# Patient Record
Sex: FEMALE | Race: WHITE | Employment: OTHER | URBAN - METROPOLITAN AREA
[De-identification: names, ages, dates, MRNs, and addresses within clinical notes are randomized per-mention and may not be internally consistent; named-entity substitution may affect disease eponyms.]

---

## 2021-08-10 ENCOUNTER — TRANSCRIBE ORDER (OUTPATIENT)
Dept: SCHEDULING | Age: 71
End: 2021-08-10

## 2021-08-10 DIAGNOSIS — Z12.31 ENCOUNTER FOR SCREENING MAMMOGRAM FOR MALIGNANT NEOPLASM OF BREAST: ICD-10-CM

## 2021-08-10 DIAGNOSIS — Z78.0 ASYMPTOMATIC MENOPAUSAL STATE: Primary | ICD-10-CM

## 2021-10-04 ENCOUNTER — HOSPITAL ENCOUNTER (OUTPATIENT)
Dept: GENERAL RADIOLOGY | Age: 71
Discharge: HOME | End: 2021-10-04
Attending: NURSE PRACTITIONER
Payer: MEDICARE

## 2021-10-04 ENCOUNTER — HOSPITAL ENCOUNTER (OUTPATIENT)
Dept: MAMMOGRAPHY | Age: 71
Discharge: HOME | End: 2021-10-04
Attending: NURSE PRACTITIONER
Payer: MEDICARE

## 2021-10-04 VITALS — BODY MASS INDEX: 26.43 KG/M2 | HEIGHT: 61 IN | WEIGHT: 140 LBS

## 2021-10-04 DIAGNOSIS — Z78.0 ASYMPTOMATIC MENOPAUSAL STATE: ICD-10-CM

## 2021-10-04 DIAGNOSIS — Z12.31 ENCOUNTER FOR SCREENING MAMMOGRAM FOR MALIGNANT NEOPLASM OF BREAST: ICD-10-CM

## 2021-10-04 PROCEDURE — 77063 BREAST TOMOSYNTHESIS BI: CPT

## 2021-10-04 PROCEDURE — 77080 DXA BONE DENSITY AXIAL: CPT

## 2021-10-27 ENCOUNTER — HOSPITAL ENCOUNTER (OUTPATIENT)
Dept: MRI IMAGING | Age: 71
Discharge: HOME | End: 2021-10-27
Attending: NURSE PRACTITIONER
Payer: MEDICARE

## 2021-10-27 DIAGNOSIS — S80.02XD CONTUSION OF LEFT KNEE, SUBSEQUENT ENCOUNTER: ICD-10-CM

## 2021-10-27 PROCEDURE — 73721 MRI JNT OF LWR EXTRE W/O DYE: CPT

## 2022-11-14 ENCOUNTER — HOSPITAL ENCOUNTER (OUTPATIENT)
Dept: INFUSION THERAPY | Age: 72
Discharge: HOME | End: 2022-11-14
Payer: MEDICARE

## 2022-11-14 VITALS
TEMPERATURE: 97.8 F | BODY MASS INDEX: 27.32 KG/M2 | WEIGHT: 144.6 LBS | DIASTOLIC BLOOD PRESSURE: 71 MMHG | OXYGEN SATURATION: 96 % | HEART RATE: 78 BPM | RESPIRATION RATE: 20 BRPM | SYSTOLIC BLOOD PRESSURE: 122 MMHG

## 2022-11-14 PROCEDURE — 74011250636 HC RX REV CODE- 250/636: Performed by: NURSE PRACTITIONER

## 2022-11-14 PROCEDURE — 96372 THER/PROPH/DIAG INJ SC/IM: CPT

## 2022-11-14 RX ORDER — ROSUVASTATIN CALCIUM 5 MG/1
5 TABLET, COATED ORAL
COMMUNITY

## 2022-11-14 RX ORDER — IBUPROFEN 200 MG
CAPSULE ORAL DAILY
COMMUNITY

## 2022-11-14 RX ORDER — ASPIRIN 325 MG
325 TABLET ORAL
COMMUNITY

## 2022-11-14 RX ORDER — CHOLECALCIFEROL (VITAMIN D3) 50 MCG
CAPSULE ORAL
COMMUNITY

## 2022-11-14 RX ADMIN — DENOSUMAB 60 MG: 60 INJECTION SUBCUTANEOUS at 11:38

## 2022-11-14 NOTE — DISCHARGE INSTRUCTIONS
denosumab (Prolia)  Pronunciation:  crispin noel mab  Brand:  Prolia  What is the most important information I should know about Prolia? This medication guide provides information about the Prolia brand of denosumab. Ronette Jakub is another brand of denosumab used to prevent bone fractures and other skeletal conditions in people with tumors that have spread to the bone. Prolia can cause many serious side effects. Call your doctor at once if you have a fever, chills, pain or burning when you urinate, severe stomach pain, cough, shortness of breath, skin problems, numbness or tingling, severe or unusual pain, or skin problems. Do not use if you are pregnant. Use effective birth control while using Prolia, and for at least 5 months after you stop. What is denosumab (Prolia)? Denosumab is a monoclonal antibody. Monoclonal antibodies are made to target and destroy only certain cells in the body. This may help to protect healthy cells from damage. The Prolia brand of denosumab is used to treat osteoporosis or bone loss in men and women who have a high risk of bone fracture. Prolia is sometimes used in people whose bone fracture is caused by certain medicines or cancer treatments. This medication guide provides information about the Prolia brand of denosumab. Ronette Jakub is another brand of denosumab used to prevent bone fractures and other skeletal conditions in people with tumors that have spread to the bone. Denosumab may also be used for purposes not listed in this medication guide. What should I discuss with my healthcare provider before receiving Prolia? You should not receive Prolia if you are allergic to denosumab, or if you have:  low levels of calcium in your blood (hypocalcemia); or  if you are pregnant. While you are using Prolia, you should not receive Xgeva, another brand of denosumab.   Tell your doctor if you have ever had:  kidney disease (or if you are on dialysis);  a weak immune system (caused by disease or by using certain medicines);  hypoparathyroidism (decreased functioning of the parathyroid glands);  thyroid surgery;  any condition that makes it hard for your body to absorb nutrients from food (malabsorption);  a latex allergy;  if you are scheduled for a dental procedure; or  if you cannot take daily calcium and vitamin D. Denosumab may cause bone loss (osteonecrosis) in the jaw. Symptoms include jaw pain or numbness, red or swollen gums, loose teeth, gum infection, or slow healing after dental work. The risk of osteonecrosis is highest in people with cancer, blood cell disorders, pre-existing dental problems, or people treated with steroids, chemotherapy, or radiation. Ask your doctor about your own risk. You may need to have a negative pregnancy test before starting this treatment. Do not use Prolia if you are pregnant. It could harm the unborn baby or cause birth defects. Use effective birth control to prevent pregnancy while you are using this medicine and for at least 5 months after your last dose. Tell your doctor right away if you become pregnant. You should not breastfeed while using denosumab. How is Prolia given? Denosumab is injected under the skin of your stomach, upper thigh, or upper arm. A healthcare provider will give you this injection. Prolia is usually given once every 6 months. Your doctor may have you take extra calcium and vitamin D while you are being treated with denosumab. Take only the amount of calcium and vitamin D that your doctor has prescribed. If you need to have any dental work (especially surgery), tell the dentist ahead of time that you are receiving denosumab. Pay special attention to your dental hygiene. Brush and floss your teeth regularly while receiving this medication. You may need to have a dental exam before you begin treatment with Prolia. Follow your doctor's instructions. Your risk of bone fractures can increase when you stop using Prolia.  Do not stop using this medicine without first talking to your doctor. If you keep this medicine at home, store it in the original carton in a refrigerator. Protect from light and do not freeze. Do not shake the prefilled syringe. You may take the carton out of the refrigerator and allow it to reach room temperature before the injection is given. After you have taken Prolia out of the refrigerator, you may keep it at room temperature for up to 14 days. Store in the original container away from heat and light. Each prefilled syringe is for one use only. Throw it away after one use, even if there is still medicine left inside. Use a needle and syringe only once and then place them in a puncture-proof \"sharps\" container. Follow state or local laws about how to dispose of this container. Keep it out of the reach of children and pets. Do not share this medicine with another person, even if they have the same symptoms you have. What happens if I miss a dose? Call your doctor for instructions if you miss a dose or miss an appointment for your Prolia injection. You should receive your missed injection as soon as possible. What happens if I overdose? Seek emergency medical attention or call the Poison Help line at 1-495.508.6092. What should I avoid while receiving Prolia? Follow your doctor's instructions about any restrictions on food, beverages, or activity. What are the possible side effects of Prolia? Get emergency medical help if you have signs of an allergic reaction: hives, itching, rash; difficult breathing, feeling light-headed; swelling of your face, lips, tongue, or throat.   Call your doctor at once if you have:  new or unusual pain in your thigh, hip, or groin;  severe pain in your joints, muscles, or bones;  skin problems such as dryness, peeling, redness, itching, blisters, bumps, oozing, or crusting; or  low calcium level --muscle spasms or contractions, numbness or tingly feeling (around your mouth, or in your fingers and toes). Serious infections may occur during treatment with Prolia. Call your doctor right away if you have signs of infection such as:  fever, chills, night sweats;  swelling, pain, tenderness, warmth, or redness anywhere on your body;  pain or burning when you urinate;  increased or urgent need to urinate;  severe stomach pain; or  cough, wheezing, feeling short of breath. Common side effects may include:  bladder infection (painful or difficult urination);  lung infection (cough, shortness of breath);  headache;  back pain, muscle pain, joint pain;  increased blood pressure;  cold symptoms such as stuffy nose, sneezing, sore throat;  high cholesterol; or  pain in your arms or legs. This is not a complete list of side effects and others may occur. Call your doctor for medical advice about side effects. You may report side effects to FDA at 1-876-FDA-1774. What other drugs will affect Prolia? Other drugs may affect Prolia, including prescription and over-the-counter medicines, vitamins, and herbal products. Tell your doctor about all your current medicines and any medicine you start or stop using. Where can I get more information? Your doctor or pharmacist can provide more information about denosumab (Prolia). Remember, keep this and all other medicines out of the reach of children, never share your medicines with others, and use this medication only for the indication prescribed. Every effort has been made to ensure that the information provided by [address] is accurate, up-to-date, and complete, but no guarantee is made to that effect. Drug information contained herein may be time sensitive. [facility] information has been compiled for use by healthcare practitioners and consumers in the [place] and therefore [employer] does not warrant that uses outside of the [place] are appropriate, unless specifically indicated otherwise.  [facility]'s drug information does not endorse drugs, diagnose patients or recommend therapy. [facility]'s drug information is an informational resource designed to assist licensed healthcare practitioners in caring for their patients and/or to serve consumers viewing this service as a supplement to, and not a substitute for, the expertise, skill, knowledge and judgment of healthcare practitioners. The absence of a warning for a given drug or drug combination in no way should be construed to indicate that the drug or drug combination is safe, effective or appropriate for any given patient. [facility] does not assume any responsibility for any aspect of healthcare administered with the aid of information [facility] provides. The information contained herein is not intended to cover all possible uses, directions, precautions, warnings, drug interactions, allergic reactions, or adverse effects. If you have questions about the drugs you are taking, check with your doctor, nurse or pharmacist.  Copyright 4235-3348 [address]: 12.01. Revision date: 2/18/2020. Care instructions adapted under license by [employer] (which disclaims liability or warranty for this information). If you have questions about a medical condition or this instruction, always ask your healthcare professional. [place] [address] any warranty or liability for your use of this information.

## 2022-11-14 NOTE — PROGRESS NOTES
\[facility]\"" [facility] Progress Note    Date: 2022    Name: Roxanne Hassan    MRN: [id number]         : 1950      Ms. Ammy Santos was assessed and education was provided. Ambulatory and voiced no new concerns. Ms. Karina Pabon vitals were reviewed and patient was observed for 5 minutes prior to treatment. Visit Vitals  /71 (BP 1 Location: Left upper arm, BP Patient Position: Sitting)   Pulse 78   Temp 97.8 °F (36.6 °C)   Resp 20   Wt 65.6 kg (144 lb 9.6 oz)   SpO2 96%   BMI 27.32 kg/m²       Lab results were reviewed. Prolia 60 mg  was administered subcutaneous in her right arm. No redness, pain or swelling at the site. Band aid applied. Ms. Ammy Santos tolerated well, and had no complaints. Patient armband removed and shredded. Ms. Ammy Santos was discharged from [place] [address] in stable condition at 11:50. She is to return on May 15th at 11:00 for her next appointment.     Hailey Toure RN  2022  12:33 PM

## 2023-01-16 ENCOUNTER — TRANSCRIBE ORDER (OUTPATIENT)
Dept: REGISTRATION | Age: 73
End: 2023-01-16

## 2023-01-16 ENCOUNTER — HOSPITAL ENCOUNTER (OUTPATIENT)
Dept: GENERAL RADIOLOGY | Age: 73
Discharge: HOME | End: 2023-01-16
Payer: MEDICARE

## 2023-01-16 DIAGNOSIS — M25.561 PAIN IN RIGHT KNEE: ICD-10-CM

## 2023-01-16 DIAGNOSIS — M25.561 PAIN IN RIGHT KNEE: Primary | ICD-10-CM

## 2023-01-16 PROCEDURE — 73564 X-RAY EXAM KNEE 4 OR MORE: CPT

## 2023-02-07 ENCOUNTER — HOSPITAL ENCOUNTER (OUTPATIENT)
Dept: MAMMOGRAPHY | Age: 73
Discharge: HOME | End: 2023-02-07
Attending: NURSE PRACTITIONER
Payer: MEDICARE

## 2023-02-07 DIAGNOSIS — Z12.31 VISIT FOR SCREENING MAMMOGRAM: ICD-10-CM

## 2023-02-07 PROCEDURE — 77063 BREAST TOMOSYNTHESIS BI: CPT

## 2023-04-04 ENCOUNTER — TRANSCRIBE ORDER (OUTPATIENT)
Dept: REGISTRATION | Age: 73
End: 2023-04-04

## 2023-04-04 ENCOUNTER — HOSPITAL ENCOUNTER (OUTPATIENT)
Dept: GENERAL RADIOLOGY | Age: 73
End: 2023-04-04
Payer: MEDICARE

## 2023-04-04 PROCEDURE — 73630 X-RAY EXAM OF FOOT: CPT

## 2023-04-07 DIAGNOSIS — M81.0 OSTEOPOROSIS WITHOUT CURRENT PATHOLOGICAL FRACTURE, UNSPECIFIED OSTEOPOROSIS TYPE: ICD-10-CM

## 2023-04-09 DIAGNOSIS — M81.0 OSTEOPOROSIS WITHOUT CURRENT PATHOLOGICAL FRACTURE, UNSPECIFIED OSTEOPOROSIS TYPE: Primary | ICD-10-CM

## 2023-04-24 ENCOUNTER — HOSPITAL ENCOUNTER (OUTPATIENT)
Dept: GENERAL RADIOLOGY | Age: 73
Discharge: HOME | End: 2023-04-24
Payer: MEDICARE

## 2023-04-24 ENCOUNTER — TRANSCRIBE ORDER (OUTPATIENT)
Dept: REGISTRATION | Age: 73
End: 2023-04-24

## 2023-04-24 DIAGNOSIS — M79.642 LEFT HAND PAIN: ICD-10-CM

## 2023-04-24 DIAGNOSIS — M79.642 LEFT HAND PAIN: Primary | ICD-10-CM

## 2023-04-24 PROCEDURE — 73130 X-RAY EXAM OF HAND: CPT

## 2023-05-15 ENCOUNTER — HOSPITAL ENCOUNTER (OUTPATIENT)
Facility: HOSPITAL | Age: 73
Discharge: HOME | End: 2023-05-18
Payer: MEDICARE

## 2023-05-15 ENCOUNTER — HOSPITAL ENCOUNTER (OUTPATIENT)
Facility: HOSPITAL | Age: 73
Setting detail: INFUSION SERIES
End: 2023-05-15
Payer: MEDICARE

## 2023-05-15 DIAGNOSIS — M81.0 OSTEOPOROSIS WITHOUT CURRENT PATHOLOGICAL FRACTURE, UNSPECIFIED OSTEOPOROSIS TYPE: ICD-10-CM

## 2023-05-15 DIAGNOSIS — R68.84 JAW PAIN: ICD-10-CM

## 2023-05-15 LAB
ALBUMIN SERPL-MCNC: 3.5 G/DL (ref 3.5–5)
ALBUMIN/GLOB SERPL: 1 (ref 1.1–2.2)
ALP SERPL-CCNC: 47 U/L (ref 45–117)
ALT SERPL-CCNC: 27 U/L (ref 12–78)
ANION GAP SERPL CALC-SCNC: 9 MMOL/L (ref 5–15)
AST SERPL-CCNC: 23 U/L (ref 15–37)
BILIRUB SERPL-MCNC: 0.3 MG/DL (ref 0.2–1)
BUN SERPL-MCNC: 17 MG/DL (ref 6–20)
BUN/CREAT SERPL: 29 (ref 12–20)
CALCIUM SERPL-MCNC: 9.2 MG/DL (ref 8.5–10.1)
CHLORIDE SERPL-SCNC: 105 MMOL/L (ref 97–108)
CO2 SERPL-SCNC: 27 MMOL/L (ref 21–32)
CREAT SERPL-MCNC: 0.58 MG/DL (ref 0.55–1.02)
GLOBULIN SER CALC-MCNC: 3.4 G/DL (ref 2–4)
GLUCOSE SERPL-MCNC: 109 MG/DL (ref 65–100)
POTASSIUM SERPL-SCNC: 4.4 MMOL/L (ref 3.5–5.1)
PROT SERPL-MCNC: 6.9 G/DL (ref 6.4–8.2)
SODIUM SERPL-SCNC: 141 MMOL/L (ref 136–145)

## 2023-05-15 PROCEDURE — 70110 X-RAY EXAM OF JAW 4/> VIEWS: CPT

## 2023-05-15 PROCEDURE — 36415 COLL VENOUS BLD VENIPUNCTURE: CPT

## 2023-05-15 PROCEDURE — 80053 COMPREHEN METABOLIC PANEL: CPT

## 2023-05-15 NOTE — PROGRESS NOTES
Pt arrived ambulatory and in no distress for peripheral lab draw prior to Prolia injection. Pt reported intermittent jaw pain recently and in the past and stated that when the pain occurs sometimes it lasts for up to 15 minutes and is very severe. She denies any jaw pain at the time of her [facility] visit. Labs drawn and in process. Ca with tx parameters. This RN contacted pt PCP and she is ordering jaw xrays due to potential osteonecrosis of the jaw. Pt denies any recent significant dental history. This RN spoke with pt PCP and she will follow up with [facility] once the xray results are complete. Called and spoke with patient and shared information. Understanding verbalized.

## 2023-08-11 ENCOUNTER — OFFICE VISIT (OUTPATIENT)
Age: 73
End: 2023-08-11

## 2023-08-11 VITALS
WEIGHT: 145 LBS | HEIGHT: 62 IN | TEMPERATURE: 98.3 F | SYSTOLIC BLOOD PRESSURE: 120 MMHG | DIASTOLIC BLOOD PRESSURE: 80 MMHG | RESPIRATION RATE: 16 BRPM | BODY MASS INDEX: 26.68 KG/M2 | OXYGEN SATURATION: 97 % | HEART RATE: 65 BPM

## 2023-08-11 DIAGNOSIS — Z12.11 COLON CANCER SCREENING: Primary | ICD-10-CM

## 2023-08-11 ASSESSMENT — PATIENT HEALTH QUESTIONNAIRE - PHQ9
2. FEELING DOWN, DEPRESSED OR HOPELESS: 0
SUM OF ALL RESPONSES TO PHQ QUESTIONS 1-9: 0
SUM OF ALL RESPONSES TO PHQ9 QUESTIONS 1 & 2: 0
1. LITTLE INTEREST OR PLEASURE IN DOING THINGS: 0
SUM OF ALL RESPONSES TO PHQ QUESTIONS 1-9: 0

## 2023-08-11 NOTE — PROGRESS NOTES
Identified pt with two pt identifiers (name and ). Reviewed chart in preparation for visit and have obtained necessary documentation. Tal Palomares is a 74 y.o. female New Patient and Colonoscopy  . There were no vitals filed for this visit. 1. Have you been to the ER, urgent care clinic since your last visit? Hospitalized since your last visit?  no     2. Have you seen or consulted any other health care providers outside of the [address] [place] [address] since your last visit? Include any pap smears or colon screening.   no

## 2023-08-11 NOTE — PROGRESS NOTES
Leslie Lopes is a 74 y.o. female who presents today with the following:  Chief Complaint   Patient presents with    New Patient    Colonoscopy       HPI    77-year-old female who presents as a referral from [facility] for possible colonoscopy. She believes her last colonoscopy was in 2019 while in [place]. She states that it was normal and no polyps were removed. She was told that she needed a repeat in 3 years. Her mother had colon cancer at 80. She is not aware of any other reason why she was told that she needed a colonoscopy in 3 years. Fortunately she brings the name and address of the provider who is Dr. Cristela Hernandez at [address] [place] [address]. [address] [place] [address], [address] [place] [address]. Phone number listed is area code [phone] and fax number is listed is [phone]. Currently she denies any personal history of melena or hematochezia or diarrhea or constipation. She has had no significant abdominal pain or unexpected weight loss. She has had no change in the caliber of her stool. She has had no recent stool testing. She has had a history of a cholecystectomy and tonsillectomy and back surgery. She does take an 81 mg aspirin 3 times a week as well as omega-3 oil. She has been vaccinated for COVID. Past Medical History:   Diagnosis Date    Menopause        No past surgical history on file.     Social History     Socioeconomic History    Marital status:      Spouse name: Not on file    Number of children: Not on file    Years of education: Not on file    Highest education level: Not on file   Occupational History    Not on file   Tobacco Use    Smoking status: Former     Types: Cigarettes    Smokeless tobacco: Not on file   Substance and Sexual Activity    Alcohol use: Not on file    Drug use: Not on file    Sexual activity: Not on file   Other Topics Concern    Not on file   Social History Narrative    Not on file     Social Determinants of Health     Financial

## 2023-08-11 NOTE — PATIENT INSTRUCTIONS
Learning About Colonoscopy  What is a colonoscopy? A colonoscopy is a test (also called a procedure) that lets a doctor look inside your large intestine. The doctor uses a thin, lighted tube called a colonoscope. The doctor uses it to look for small growths called polyps, colon or rectal cancer (colorectal cancer), or other problems like bleeding. During the procedure, the doctor can take samples of tissue. The samples can then be checked for cancer or other conditions. The doctor can also take out polyps. How is a colonoscopy done? This procedure is done in a doctor's office or a clinic or hospital. You will get medicine to help you relax and not feel pain. Some people find that they don't remember having the test because of the medicine. The doctor gently moves the colonoscope, or scope, through the colon. The scope is also a small video camera. It lets the doctor see the colon and take pictures. How do you prepare for the procedure? You need to clean out your colon before the procedure so the doctor can see your colon. This depends on which \"colon prep\" your doctor recommends. To clean out your colon, you'll do a \"colon prep\" before the test. This means you stop eating solid foods and drink only clear liquids. You can have water, tea, coffee, clear juices, clear broths, flavored ice pops, and gelatin (such as Jell-O). Do not drink anything red or purple. The day or night before the procedure, you drink a large amount of a special liquid. This causes loose, frequent stools. You will go to the bathroom a lot. Your doctor may have you drink part of the liquid the evening before and the rest on the day of the test. It's very important to drink all of the liquid. If you have problems drinking it, call your doctor. Arrange to have someone take you home after the test.  What can you expect after a colonoscopy? Your doctor will tell you when you can eat and do your usual activities.   Drink a lot of

## 2023-11-07 ENCOUNTER — TRANSCRIBE ORDERS (OUTPATIENT)
Facility: HOSPITAL | Age: 73
End: 2023-11-07

## 2023-11-07 DIAGNOSIS — Z12.31 ENCOUNTER FOR SCREENING MAMMOGRAM FOR MALIGNANT NEOPLASM OF BREAST: ICD-10-CM

## 2023-11-07 DIAGNOSIS — M81.0 AGE-RELATED OSTEOPOROSIS WITHOUT CURRENT PATHOLOGICAL FRACTURE: Primary | ICD-10-CM

## 2023-11-14 RX ORDER — DIPHENHYDRAMINE HYDROCHLORIDE 50 MG/ML
50 INJECTION INTRAMUSCULAR; INTRAVENOUS
Status: CANCELLED | OUTPATIENT
Start: 2023-11-14

## 2023-11-14 RX ORDER — SODIUM CHLORIDE 9 MG/ML
INJECTION, SOLUTION INTRAVENOUS CONTINUOUS
Status: CANCELLED | OUTPATIENT
Start: 2023-11-14

## 2023-11-14 RX ORDER — ALBUTEROL SULFATE 90 UG/1
4 AEROSOL, METERED RESPIRATORY (INHALATION) PRN
Status: CANCELLED | OUTPATIENT
Start: 2023-11-14

## 2023-11-14 RX ORDER — EPINEPHRINE 1 MG/ML
0.3 INJECTION, SOLUTION, CONCENTRATE INTRAVENOUS PRN
Status: CANCELLED | OUTPATIENT
Start: 2023-11-14

## 2023-11-14 RX ORDER — ONDANSETRON 2 MG/ML
8 INJECTION INTRAMUSCULAR; INTRAVENOUS
Status: CANCELLED | OUTPATIENT
Start: 2023-11-14

## 2023-11-14 RX ORDER — ACETAMINOPHEN 325 MG/1
650 TABLET ORAL
Status: CANCELLED | OUTPATIENT
Start: 2023-11-14

## 2023-11-16 ENCOUNTER — HOSPITAL ENCOUNTER (OUTPATIENT)
Facility: HOSPITAL | Age: 73
Setting detail: INFUSION SERIES
End: 2023-11-16

## 2023-11-16 DIAGNOSIS — M81.0 OSTEOPOROSIS WITHOUT CURRENT PATHOLOGICAL FRACTURE, UNSPECIFIED OSTEOPOROSIS TYPE: Primary | ICD-10-CM

## 2023-11-21 ENCOUNTER — HOSPITAL ENCOUNTER (OUTPATIENT)
Facility: HOSPITAL | Age: 73
Setting detail: INFUSION SERIES
Discharge: HOME | End: 2023-11-21
Payer: MEDICARE

## 2023-11-21 VITALS
HEART RATE: 66 BPM | WEIGHT: 144.8 LBS | OXYGEN SATURATION: 95 % | HEIGHT: 62 IN | DIASTOLIC BLOOD PRESSURE: 64 MMHG | RESPIRATION RATE: 16 BRPM | TEMPERATURE: 97.6 F | BODY MASS INDEX: 26.65 KG/M2 | SYSTOLIC BLOOD PRESSURE: 116 MMHG

## 2023-11-21 DIAGNOSIS — M81.0 OSTEOPOROSIS WITHOUT CURRENT PATHOLOGICAL FRACTURE, UNSPECIFIED OSTEOPOROSIS TYPE: Primary | ICD-10-CM

## 2023-11-21 PROCEDURE — 6360000002 HC RX W HCPCS: Performed by: NURSE PRACTITIONER

## 2023-11-21 PROCEDURE — 96372 THER/PROPH/DIAG INJ SC/IM: CPT

## 2023-11-21 RX ORDER — VITAMIN B COMPLEX
TABLET ORAL
COMMUNITY

## 2023-11-21 RX ORDER — EPINEPHRINE 1 MG/ML
0.3 INJECTION, SOLUTION, CONCENTRATE INTRAVENOUS PRN
Status: DISCONTINUED | OUTPATIENT
Start: 2023-11-21 | End: 2023-11-22 | Stop reason: HOSPADM

## 2023-11-21 RX ORDER — ALBUTEROL SULFATE 90 UG/1
4 AEROSOL, METERED RESPIRATORY (INHALATION) PRN
OUTPATIENT
Start: 2024-05-19

## 2023-11-21 RX ORDER — ONDANSETRON 2 MG/ML
8 INJECTION INTRAMUSCULAR; INTRAVENOUS
OUTPATIENT
Start: 2024-05-19

## 2023-11-21 RX ORDER — SODIUM CHLORIDE 9 MG/ML
INJECTION, SOLUTION INTRAVENOUS CONTINUOUS
Status: DISCONTINUED | OUTPATIENT
Start: 2023-11-21 | End: 2023-11-22 | Stop reason: HOSPADM

## 2023-11-21 RX ORDER — SODIUM CHLORIDE 9 MG/ML
INJECTION, SOLUTION INTRAVENOUS CONTINUOUS
OUTPATIENT
Start: 2024-05-19

## 2023-11-21 RX ORDER — ONDANSETRON 2 MG/ML
8 INJECTION INTRAMUSCULAR; INTRAVENOUS
Status: DISCONTINUED | OUTPATIENT
Start: 2023-11-21 | End: 2023-11-22 | Stop reason: HOSPADM

## 2023-11-21 RX ORDER — ACETAMINOPHEN 325 MG/1
650 TABLET ORAL
OUTPATIENT
Start: 2024-05-19

## 2023-11-21 RX ORDER — ALBUTEROL SULFATE 90 UG/1
4 AEROSOL, METERED RESPIRATORY (INHALATION) PRN
Status: DISCONTINUED | OUTPATIENT
Start: 2023-11-21 | End: 2023-11-22 | Stop reason: HOSPADM

## 2023-11-21 RX ORDER — EPINEPHRINE 1 MG/ML
0.3 INJECTION, SOLUTION, CONCENTRATE INTRAVENOUS PRN
OUTPATIENT
Start: 2024-05-19

## 2023-11-21 RX ORDER — CHLORAL HYDRATE 500 MG
CAPSULE ORAL DAILY
COMMUNITY

## 2023-11-21 RX ORDER — DIPHENHYDRAMINE HYDROCHLORIDE 50 MG/ML
50 INJECTION INTRAMUSCULAR; INTRAVENOUS
OUTPATIENT
Start: 2024-05-19

## 2023-11-21 RX ORDER — DIPHENHYDRAMINE HYDROCHLORIDE 50 MG/ML
50 INJECTION INTRAMUSCULAR; INTRAVENOUS
Status: DISCONTINUED | OUTPATIENT
Start: 2023-11-21 | End: 2023-11-22 | Stop reason: HOSPADM

## 2023-11-21 RX ORDER — ACETAMINOPHEN 325 MG/1
650 TABLET ORAL
Status: DISCONTINUED | OUTPATIENT
Start: 2023-11-21 | End: 2023-11-22 | Stop reason: HOSPADM

## 2023-11-21 RX ADMIN — DENOSUMAB 60 MG: 60 INJECTION SUBCUTANEOUS at 11:28

## 2023-11-21 ASSESSMENT — PAIN DESCRIPTION - FREQUENCY: FREQUENCY: INTERMITTENT

## 2023-11-21 ASSESSMENT — PAIN SCALES - GENERAL: PAINLEVEL_OUTOF10: 2

## 2023-11-21 ASSESSMENT — PAIN DESCRIPTION - ORIENTATION: ORIENTATION: RIGHT

## 2023-11-21 ASSESSMENT — PAIN DESCRIPTION - LOCATION: LOCATION: RIB CAGE

## 2023-11-21 ASSESSMENT — PAIN DESCRIPTION - DESCRIPTORS: DESCRIPTORS: ACHING

## 2024-02-14 ENCOUNTER — HOSPITAL ENCOUNTER (OUTPATIENT)
Facility: HOSPITAL | Age: 74
Discharge: HOME | End: 2024-02-17
Payer: MEDICARE

## 2024-02-14 DIAGNOSIS — M81.0 AGE-RELATED OSTEOPOROSIS WITHOUT CURRENT PATHOLOGICAL FRACTURE: ICD-10-CM

## 2024-02-14 DIAGNOSIS — Z12.31 ENCOUNTER FOR SCREENING MAMMOGRAM FOR MALIGNANT NEOPLASM OF BREAST: ICD-10-CM

## 2024-02-14 PROCEDURE — 77063 BREAST TOMOSYNTHESIS BI: CPT

## 2024-02-14 PROCEDURE — 77080 DXA BONE DENSITY AXIAL: CPT

## 2024-04-22 ENCOUNTER — TRANSCRIBE ORDERS (OUTPATIENT)
Facility: HOSPITAL | Age: 74
End: 2024-04-22

## 2024-04-22 DIAGNOSIS — E78.5 HYPERLIPIDEMIA, UNSPECIFIED HYPERLIPIDEMIA TYPE: Primary | ICD-10-CM

## 2024-05-22 ENCOUNTER — OFFICE VISIT (OUTPATIENT)
Age: 74
End: 2024-05-22

## 2024-05-22 ENCOUNTER — PREP FOR PROCEDURE (OUTPATIENT)
Age: 74
End: 2024-05-22

## 2024-05-22 VITALS
HEIGHT: 61 IN | HEART RATE: 81 BPM | OXYGEN SATURATION: 95 % | RESPIRATION RATE: 12 BRPM | SYSTOLIC BLOOD PRESSURE: 114 MMHG | DIASTOLIC BLOOD PRESSURE: 72 MMHG | WEIGHT: 147 LBS | TEMPERATURE: 98.6 F | BODY MASS INDEX: 27.75 KG/M2

## 2024-05-22 DIAGNOSIS — Z80.0 FAMILY HISTORY OF COLON CANCER IN MOTHER: Primary | ICD-10-CM

## 2024-05-22 DIAGNOSIS — Z80.0 FAMILY HISTORY OF COLON CANCER IN MOTHER: ICD-10-CM

## 2024-05-22 ASSESSMENT — PATIENT HEALTH QUESTIONNAIRE - PHQ9
SUM OF ALL RESPONSES TO PHQ QUESTIONS 1-9: 0
SUM OF ALL RESPONSES TO PHQ9 QUESTIONS 1 & 2: 0
1. LITTLE INTEREST OR PLEASURE IN DOING THINGS: NOT AT ALL
SUM OF ALL RESPONSES TO PHQ QUESTIONS 1-9: 0
2. FEELING DOWN, DEPRESSED OR HOPELESS: NOT AT ALL

## 2024-05-22 NOTE — PROGRESS NOTES
Identified pt with two pt identifiers (name and ). Reviewed chart in preparation for visit and have obtained necessary documentation.    Cassie Duff is a 73 y.o. female Colonoscopy  .    There were no vitals filed for this visit.       1. Have you been to the ER, urgent care clinic since your last visit?  Hospitalized since your last visit?  no     2. Have you seen or consulted any other health care providers outside of the [facility] since your last visit?  Include any pap smears or colon screening.  yes - [facility] internist      /72  pulse 81 temp 98.6 resp. 12  oxygen 95  
made aware that zaki Covid-19  may worsen their prognosis for recovering from their procedure  and lend to a higher morbidity and/or mortality risk.  All material risks, benefits, and reasonable alternatives including postponing the procedure were discussed. The patient does wish to proceed with the procedure at this time.         Return for post colonoscopy.     Wilder Gonzales MD

## 2024-05-24 ENCOUNTER — HOSPITAL ENCOUNTER (OUTPATIENT)
Facility: HOSPITAL | Age: 74
Setting detail: INFUSION SERIES
Discharge: HOME | End: 2024-05-24
Payer: MEDICARE

## 2024-05-24 VITALS
WEIGHT: 146 LBS | RESPIRATION RATE: 18 BRPM | DIASTOLIC BLOOD PRESSURE: 67 MMHG | TEMPERATURE: 97.8 F | OXYGEN SATURATION: 97 % | BODY MASS INDEX: 27.56 KG/M2 | SYSTOLIC BLOOD PRESSURE: 113 MMHG | HEART RATE: 80 BPM | HEIGHT: 61 IN

## 2024-05-24 DIAGNOSIS — M81.0 OSTEOPOROSIS WITHOUT CURRENT PATHOLOGICAL FRACTURE, UNSPECIFIED OSTEOPOROSIS TYPE: Primary | ICD-10-CM

## 2024-05-24 PROCEDURE — 6360000002 HC RX W HCPCS: Performed by: NURSE PRACTITIONER

## 2024-05-24 PROCEDURE — 96372 THER/PROPH/DIAG INJ SC/IM: CPT

## 2024-05-24 RX ORDER — EPINEPHRINE 1 MG/ML
0.3 INJECTION, SOLUTION, CONCENTRATE INTRAVENOUS PRN
Status: DISCONTINUED | OUTPATIENT
Start: 2024-05-24 | End: 2024-05-25 | Stop reason: HOSPADM

## 2024-05-24 RX ORDER — ALBUTEROL SULFATE 90 UG/1
4 AEROSOL, METERED RESPIRATORY (INHALATION) PRN
OUTPATIENT
Start: 2024-11-17

## 2024-05-24 RX ORDER — SODIUM CHLORIDE 9 MG/ML
INJECTION, SOLUTION INTRAVENOUS CONTINUOUS
Status: DISCONTINUED | OUTPATIENT
Start: 2024-05-24 | End: 2024-05-25 | Stop reason: HOSPADM

## 2024-05-24 RX ORDER — ONDANSETRON 2 MG/ML
8 INJECTION INTRAMUSCULAR; INTRAVENOUS
OUTPATIENT
Start: 2024-11-17

## 2024-05-24 RX ORDER — ONDANSETRON 2 MG/ML
8 INJECTION INTRAMUSCULAR; INTRAVENOUS
Status: DISCONTINUED | OUTPATIENT
Start: 2024-05-24 | End: 2024-05-25 | Stop reason: HOSPADM

## 2024-05-24 RX ORDER — EPINEPHRINE 1 MG/ML
0.3 INJECTION, SOLUTION, CONCENTRATE INTRAVENOUS PRN
OUTPATIENT
Start: 2024-11-17

## 2024-05-24 RX ORDER — ALBUTEROL SULFATE 90 UG/1
4 AEROSOL, METERED RESPIRATORY (INHALATION) PRN
Status: DISCONTINUED | OUTPATIENT
Start: 2024-05-24 | End: 2024-05-25 | Stop reason: HOSPADM

## 2024-05-24 RX ORDER — ACETAMINOPHEN 325 MG/1
650 TABLET ORAL
Status: DISCONTINUED | OUTPATIENT
Start: 2024-05-24 | End: 2024-05-25 | Stop reason: HOSPADM

## 2024-05-24 RX ORDER — DIPHENHYDRAMINE HYDROCHLORIDE 50 MG/ML
50 INJECTION INTRAMUSCULAR; INTRAVENOUS
OUTPATIENT
Start: 2024-11-17

## 2024-05-24 RX ORDER — ACETAMINOPHEN 325 MG/1
650 TABLET ORAL
OUTPATIENT
Start: 2024-11-17

## 2024-05-24 RX ORDER — SODIUM CHLORIDE 9 MG/ML
INJECTION, SOLUTION INTRAVENOUS CONTINUOUS
OUTPATIENT
Start: 2024-11-17

## 2024-05-24 RX ORDER — DIPHENHYDRAMINE HYDROCHLORIDE 50 MG/ML
50 INJECTION INTRAMUSCULAR; INTRAVENOUS
Status: DISCONTINUED | OUTPATIENT
Start: 2024-05-24 | End: 2024-05-25 | Stop reason: HOSPADM

## 2024-05-24 RX ADMIN — DENOSUMAB 60 MG: 60 INJECTION SUBCUTANEOUS at 14:20

## 2024-05-24 NOTE — DISCHARGE INSTRUCTIONS
does not endorse drugs, diagnose patients or recommend therapy. [facility]'s drug information is an informational resource designed to assist licensed healthcare practitioners in caring for their patients and/or to serve consumers viewing this service as a supplement to, and not a substitute for, the expertise, skill, knowledge and judgment of healthcare practitioners. The absence of a warning for a given drug or drug combination in no way should be construed to indicate that the drug or drug combination is safe, effective or appropriate for any given patient. [facility] does not assume any responsibility for any aspect of healthcare administered with the aid of information [facility] provides. The information contained herein is not intended to cover all possible uses, directions, precautions, warnings, drug interactions, allergic reactions, or adverse effects. If you have questions about the drugs you are taking, check with your doctor, nurse or pharmacist.  Copyright 6175-2287 [facility]. Version: 12.01. Revision date: 2/18/2020.  Care instructions adapted under license by [employer]. If you have questions about a medical condition or this instruction, always ask your healthcare professional. [employer], [employer] disclaims any warranty or liability for your use of this information.

## 2024-05-24 NOTE — PROGRESS NOTES
[facility] Progress Note    Date: May 24, 2024    Name: Cassie Duff    MRN: [id number]         : 1950      Ms. Duff was assessed and education was provided.     Ms. Duff's vitals were reviewed and patient was observed for 5 minutes prior to treatment.   Vitals:    24 1400   BP: 113/67   Pulse: 80   Resp: 18   Temp: 97.8 °F (36.6 °C)   SpO2: 97%       Lab results were reviewed.    States that periodically she has \"debillitating\" jaw pain that radiates to her chest - does not happen after her prolia and often resolves after deep breathing. States she does wear a mouth guard. States that she has had it several times and has mentioned it to her dentist and her PCP. Instructed her to be mindful of the times that it occurs as well as if it correlates to soon after her injection.      Prolia 60mg was administered subcutaneous in  left arm.  Handout provided on Prolia    Ms. Duff tolerated well, and had no complaints.  Patient armband was removed and shredded.    [facility] at 1424. She is to return on 24 at 1400 for her next appointment.    Shae Ferrer RN  May 24, 2024  2:24 PM

## 2024-07-10 ENCOUNTER — ANESTHESIA EVENT (OUTPATIENT)
Facility: HOSPITAL | Age: 74
End: 2024-07-10
Payer: MEDICARE

## 2024-07-10 NOTE — ANESTHESIA PRE PROCEDURE
Department of Anesthesiology  Preprocedure Note       Name:  Cassie Duff   Age:  73 y.o.  :  1950                                          MRN:  [id number]         Date:  7/10/2024      Surgeon: Surgeon(s):  Wilder Gonzales MD    Procedure: Procedure(s):  COLONOSCOPY    Medications prior to admission:   Prior to Admission medications    Medication Sig Start Date End Date Taking? Authorizing Provider   Omega-3 Fatty Acids (FISH OIL) 1000 MG capsule Take by mouth daily    Pj Mac MD   Coenzyme Q10 (COQ10) 100 MG CAPS Take by mouth    Pj Mac MD   Digestive Enzymes (LIPASE CONCENTRATE-HP PO) Take by mouth    Pj Mac MD   Multiple Vitamins-Minerals (PRESERVISION AREDS 2+MULTI VIT PO) Take by mouth    Pj Mac MD   aspirin 325 MG tablet Take 1 tablet by mouth    Automatic Reconciliation, Ar   calcium citrate (CALCITRATE) 950 (200 Ca) MG tablet Take by mouth daily    Automatic Reconciliation, Ar   rosuvastatin (CRESTOR) 5 MG tablet Take 1 tablet by mouth nightly    Automatic Reconciliation, Ar       Current medications:    No current facility-administered medications for this encounter.     Current Outpatient Medications   Medication Sig Dispense Refill   • Omega-3 Fatty Acids (FISH OIL) 1000 MG capsule Take by mouth daily     • Coenzyme Q10 (COQ10) 100 MG CAPS Take by mouth     • Digestive Enzymes (LIPASE CONCENTRATE-HP PO) Take by mouth     • Multiple Vitamins-Minerals (PRESERVISION AREDS 2+MULTI VIT PO) Take by mouth     • aspirin 325 MG tablet Take 1 tablet by mouth     • calcium citrate (CALCITRATE) 950 (200 Ca) MG tablet Take by mouth daily     • rosuvastatin (CRESTOR) 5 MG tablet Take 1 tablet by mouth nightly         Allergies:    Allergies   Allergen Reactions   • Latex Shortness Of Breath   • Codeine      Other reaction(s): Unknown (comments)       Problem List:    Patient Active Problem List   Diagnosis Code   • Osteoporosis without

## 2024-07-17 NOTE — PERIOP NOTE
[facility]  SURGICAL PRE-ADMISSION INSTRUCTIONS    ARRIVAL  You will be called the day before your surgery with your expected arrival time.  Sign in at the  of the hospital.  You will be directed to the Surgical Waiting Room.  Please arrive at your scheduled appointment time.  You have been scheduled to arrive for your procedure one or two hours prior to the expected start time of your procedure.  Every effort will be made to minimize your wait but please be aware that unforeseen circumstances may affect our schedule.    EATING  DO NOT EAT OR DRINK ANYTHING AFTER MIDNIGHT ON THE EVENING BEFORE YOUR SURGERY OR ON THE DAY OF YOUR SURGERY except for your medications (as instructed) with a sip of water.  Do not use gum, mints or lozenges on the morning of your surgery.  Please do not smoke or chew tobacco before your surgery.    MEDICATIONS   Take the following medications on the morning of your surgery with the smallest amount of water possible : NONE    STOP THESE MEDICATIONS AT THE TIMES LISTED BELOW  Blood thinner(s) : CoQ10 and Fish Oil ;  Consult with medical doctor  Aspirin ;  7 days before                                                   DRIVING/TRANSPORATION  Have a responsible adult to drive you home from the hospital and to stay with you over night.  Please have them plan to remain in the hospital during your surgery.  Your surgery will not be done if you do not have a responsible adult to take you home and to stay with you.  If you have arranged for public transport, you must have a responsible adult to ride with you (who is not the ).  You may not drive for 24 hours after anesthesia.    PREPARATION  If you have a Living WiIl/Advance Directive, please bring a copy with you to scan into your chart.   Please DO NOT wear makeup or nail polish  Please leave valuables at home,  DO NOT wear jewelry.  Wear loose, comfortable clothing that is large enough to cover a

## 2024-07-24 ENCOUNTER — PREP FOR PROCEDURE (OUTPATIENT)
Age: 74
End: 2024-07-24

## 2024-07-24 ENCOUNTER — TELEPHONE (OUTPATIENT)
Age: 74
End: 2024-07-24

## 2024-07-24 ENCOUNTER — CLINICAL DOCUMENTATION (OUTPATIENT)
Age: 74
End: 2024-07-24

## 2024-07-24 RX ORDER — SODIUM CHLORIDE 0.9 % (FLUSH) 0.9 %
5-40 SYRINGE (ML) INJECTION PRN
Status: CANCELLED | OUTPATIENT
Start: 2024-07-24

## 2024-07-24 RX ORDER — SODIUM CHLORIDE 9 MG/ML
INJECTION, SOLUTION INTRAVENOUS PRN
Status: CANCELLED | OUTPATIENT
Start: 2024-07-24

## 2024-07-24 RX ORDER — SODIUM CHLORIDE 0.9 % (FLUSH) 0.9 %
5-40 SYRINGE (ML) INJECTION EVERY 12 HOURS SCHEDULED
Status: CANCELLED | OUTPATIENT
Start: 2024-07-24

## 2024-07-24 RX ORDER — SODIUM CHLORIDE, SODIUM LACTATE, POTASSIUM CHLORIDE, CALCIUM CHLORIDE 600; 310; 30; 20 MG/100ML; MG/100ML; MG/100ML; MG/100ML
INJECTION, SOLUTION INTRAVENOUS CONTINUOUS
Status: CANCELLED | OUTPATIENT
Start: 2024-07-24

## 2024-07-24 NOTE — H&P (VIEW-ONLY)
HPI    73-year-old female who presents as a referral from Kimberley Ruano for possible colonoscopy.  She believes her last colonoscopy was in 2019 while in [facility].  We have obtained the results and her colonoscopy was normal without polyps and masses.  Her mother had colon cancer at 86. Currently she denies any personal history of melena or hematochezia or diarrhea or constipation.  She has had no significant abdominal pain or unexpected weight loss.  She has had no change in the caliber of her stool.  She has had no recent stool testing.     She has had a history of a cholecystectomy and tonsillectomy and back surgery.     She does take an 81 mg aspirin 3 times a week as well as omega-3 oil.     She has been vaccinated for COVID.  Past Medical History:   Diagnosis Date    Hyperlipidemia     Menopause     Osteoporosis        Past Surgical History:   Procedure Laterality Date    CHOLECYSTECTOMY      TONSILLECTOMY         Social History     Socioeconomic History    Marital status:      Spouse name: Not on file    Number of children: Not on file    Years of education: Not on file    Highest education level: Not on file   Occupational History    Not on file   Tobacco Use    Smoking status: Former     Current packs/day: 0.00     Average packs/day: 0.5 packs/day for 10.0 years (5.0 ttl pk-yrs)     Types: Cigarettes     Start date:      Quit date:      Years since quittin.5    Smokeless tobacco: Not on file   Substance and Sexual Activity    Alcohol use: Not on file    Drug use: Not on file    Sexual activity: Not on file   Other Topics Concern    Not on file   Social History Narrative    Not on file     Social Determinants of Health     Financial Resource Strain: Not on file   Food Insecurity: Not on file   Transportation Needs: Not on file   Physical Activity: Not on file   Stress: Not on file   Social Connections: Not on file   Intimate Partner Violence: Not on file   Housing

## 2024-07-24 NOTE — PROGRESS NOTES
Patient was also asked if she was a diabetic when she told me she felt light headed.  Patient told me she isn't a diabetic.    Today patient started her prep for her scheduled colonoscopy.  Tolerating the prep well.

## 2024-07-24 NOTE — TELEPHONE ENCOUNTER
Patient called office stating that after she took Dulcolax pills (4) she begin to feel light headed along with some abdominal cramping.  Explained to patient that abdominal cramping is normal but not feeling light headed.    Patient was asked to sit down and if she had a BP machined (BP may be low) patient states her BP normally runs low.  Patient is going to get her  to help her to take her BP.      I strongly stressed to the patient to call 911 due to they can check her BP and if addition medical attention is need they will be on site.    I told patient I will call her back in 20 minutes.     1058  Follow-up call.  Patient told me her BP was 149/99, she just had a large BM, and she is feeling much better.    I told patient that I will call her back at 11:30 am    Follow-up call at 1130  no answer I will follow- up later    1230   follow-up call patient states she is feeling better.

## 2024-07-25 ENCOUNTER — HOSPITAL ENCOUNTER (OUTPATIENT)
Facility: HOSPITAL | Age: 74
Setting detail: OUTPATIENT SURGERY
Discharge: HOME | End: 2024-07-25
Attending: SURGERY | Admitting: SURGERY
Payer: MEDICARE

## 2024-07-25 ENCOUNTER — ANESTHESIA (OUTPATIENT)
Facility: HOSPITAL | Age: 74
End: 2024-07-25
Payer: MEDICARE

## 2024-07-25 VITALS
DIASTOLIC BLOOD PRESSURE: 73 MMHG | WEIGHT: 146 LBS | SYSTOLIC BLOOD PRESSURE: 97 MMHG | HEART RATE: 72 BPM | TEMPERATURE: 97.5 F | BODY MASS INDEX: 27.56 KG/M2 | OXYGEN SATURATION: 97 % | HEIGHT: 61 IN | RESPIRATION RATE: 17 BRPM

## 2024-07-25 PROCEDURE — 3600000002 HC SURGERY LEVEL 2 BASE: Performed by: SURGERY

## 2024-07-25 PROCEDURE — 2580000003 HC RX 258: Performed by: SURGERY

## 2024-07-25 PROCEDURE — 2709999900 HC NON-CHARGEABLE SUPPLY: Performed by: SURGERY

## 2024-07-25 PROCEDURE — G0105 COLORECTAL SCRN; HI RISK IND: HCPCS | Performed by: SURGERY

## 2024-07-25 PROCEDURE — 2500000003 HC RX 250 WO HCPCS: Performed by: ANESTHESIOLOGY

## 2024-07-25 PROCEDURE — 3700000000 HC ANESTHESIA ATTENDED CARE: Performed by: SURGERY

## 2024-07-25 PROCEDURE — 7100000000 HC PACU RECOVERY - FIRST 15 MIN: Performed by: SURGERY

## 2024-07-25 PROCEDURE — 7100000001 HC PACU RECOVERY - ADDTL 15 MIN: Performed by: SURGERY

## 2024-07-25 PROCEDURE — 3700000001 HC ADD 15 MINUTES (ANESTHESIA): Performed by: SURGERY

## 2024-07-25 PROCEDURE — 6360000002 HC RX W HCPCS: Performed by: ANESTHESIOLOGY

## 2024-07-25 PROCEDURE — 3600000012 HC SURGERY LEVEL 2 ADDTL 15MIN: Performed by: SURGERY

## 2024-07-25 RX ORDER — LIDOCAINE HYDROCHLORIDE 20 MG/ML
INJECTION, SOLUTION EPIDURAL; INFILTRATION; INTRACAUDAL; PERINEURAL PRN
Status: DISCONTINUED | OUTPATIENT
Start: 2024-07-25 | End: 2024-07-25 | Stop reason: SDUPTHER

## 2024-07-25 RX ORDER — PROPOFOL 10 MG/ML
INJECTION, EMULSION INTRAVENOUS PRN
Status: DISCONTINUED | OUTPATIENT
Start: 2024-07-25 | End: 2024-07-25 | Stop reason: SDUPTHER

## 2024-07-25 RX ORDER — SODIUM CHLORIDE 0.9 % (FLUSH) 0.9 %
5-40 SYRINGE (ML) INJECTION EVERY 12 HOURS SCHEDULED
Status: DISCONTINUED | OUTPATIENT
Start: 2024-07-25 | End: 2024-07-25 | Stop reason: HOSPADM

## 2024-07-25 RX ORDER — SODIUM CHLORIDE 9 MG/ML
INJECTION, SOLUTION INTRAVENOUS PRN
Status: DISCONTINUED | OUTPATIENT
Start: 2024-07-25 | End: 2024-07-25 | Stop reason: HOSPADM

## 2024-07-25 RX ORDER — SODIUM CHLORIDE 0.9 % (FLUSH) 0.9 %
5-40 SYRINGE (ML) INJECTION PRN
Status: DISCONTINUED | OUTPATIENT
Start: 2024-07-25 | End: 2024-07-25 | Stop reason: HOSPADM

## 2024-07-25 RX ORDER — SODIUM CHLORIDE, SODIUM LACTATE, POTASSIUM CHLORIDE, CALCIUM CHLORIDE 600; 310; 30; 20 MG/100ML; MG/100ML; MG/100ML; MG/100ML
INJECTION, SOLUTION INTRAVENOUS CONTINUOUS
Status: DISCONTINUED | OUTPATIENT
Start: 2024-07-25 | End: 2024-07-25 | Stop reason: HOSPADM

## 2024-07-25 RX ORDER — NALOXONE HYDROCHLORIDE 0.4 MG/ML
INJECTION, SOLUTION INTRAMUSCULAR; INTRAVENOUS; SUBCUTANEOUS PRN
Status: DISCONTINUED | OUTPATIENT
Start: 2024-07-25 | End: 2024-07-25 | Stop reason: HOSPADM

## 2024-07-25 RX ADMIN — LIDOCAINE HYDROCHLORIDE 100 MG: 20 INJECTION, SOLUTION EPIDURAL; INFILTRATION; INTRACAUDAL; PERINEURAL at 10:41

## 2024-07-25 RX ADMIN — PROPOFOL 75 MG: 10 INJECTION, EMULSION INTRAVENOUS at 10:48

## 2024-07-25 RX ADMIN — PROPOFOL 50 MG: 10 INJECTION, EMULSION INTRAVENOUS at 10:55

## 2024-07-25 RX ADMIN — PROPOFOL 25 MG: 10 INJECTION, EMULSION INTRAVENOUS at 11:00

## 2024-07-25 RX ADMIN — PROPOFOL 50 MG: 10 INJECTION, EMULSION INTRAVENOUS at 11:06

## 2024-07-25 RX ADMIN — SODIUM CHLORIDE, POTASSIUM CHLORIDE, SODIUM LACTATE AND CALCIUM CHLORIDE: 600; 310; 30; 20 INJECTION, SOLUTION INTRAVENOUS at 10:17

## 2024-07-25 RX ADMIN — PROPOFOL 100 MG: 10 INJECTION, EMULSION INTRAVENOUS at 10:41

## 2024-07-25 NOTE — INTERVAL H&P NOTE
Update History & Physical    The patient's History and Physical of July 24, 2024 was reviewed with the patient and I examined the patient. There was no change. The surgical site was confirmed by the patient and me.     Plan: The risks, benefits, expected outcome, and alternative to the recommended procedure have been discussed with the patient. Patient understands and wants to proceed with the procedure.     Electronically signed by Wilder Gonzales MD on 7/25/2024 at 10:30 AM

## 2024-07-25 NOTE — OP NOTE
[address]  [address]                            OPERATIVE REPORT      PATIENT NAME: SEVERINO MULLEN         : 1950  MED REC NO: [id number]                       ROOM: OR  ACCOUNT NO: [id number]                       ADMIT DATE: 2024  PROVIDER: Wilder Gonzales MD    DATE OF SERVICE:  2024    PREOPERATIVE DIAGNOSES:  Family history of colon cancer.    POSTOPERATIVE DIAGNOSES:  Family history of colon cancer.    PROCEDURES PERFORMED:  Colonoscopy.    SURGEON:  Wilder Gonzales MD    ASSISTANT:  None.    ANESTHESIA:  MAC.    ESTIMATED BLOOD LOSS:  Zero.    SPECIMENS REMOVED:  None.    INTRAOPERATIVE FINDINGS:       1. Sigmoid diverticulosis.     2. Mild internal hemorrhoids.     COMPLICATIONS:  None.    IMPLANTS:  None.    INDICATIONS:  Family history of colon cancer.    DESCRIPTION OF PROCEDURE:  The patient was brought to the operative theater.  Monitoring devices and nasal cannula O2 placed per Anesthesia and the patient was placed in a left-side-down decubitus position.  IV sedation was administered and a time-out was performed.  Digital rectal exam was performed, which was essentially normal.  A well-lubricated Olympus pediatric colonoscope was introduced in the patient's rectum and advanced to the cecum.  We briefly needed to apply gentle abdominal pressure.  The cecum was identified by the appendiceal orifice and ileocecal valve.  The prep was adequate to proceed.  The scope was carefully withdrawn with mucosal surfaces circumferentially evaluated.  No significant abnormalities were noted in the cecum or ascending colon.  The transverse colon appeared to be free of disease to the descending colon.  Sigmoid colon showed diverticulosis.  The scope was pulled back into the rectum and retroflexed, which revealed mild internal hemorrhoids.  The scope was straightened out and carefully withdrawn.    The patient

## 2024-07-25 NOTE — BRIEF OP NOTE
Brief Postoperative Note      Patient: Cassie Duff  YOB: 1950  MRN: [id number]    Date of Procedure: 7/25/2024    Pre-Op Diagnosis Codes:     * Family history of colon cancer in mother [Z80.0]    Post-Op Diagnosis: Same       Procedure(s):  COLONOSCOPY    Surgeon(s):  Wilder Gonzales MD    Assistant:  * No surgical staff found *    Anesthesia: Monitor Anesthesia Care    Estimated Blood Loss (mL): 0    Complications: None    Specimens:   * No specimens in log *    Implants:  * No implants in log *      Drains: * No LDAs found *    Findings:  Infection Present At Time Of Surgery (PATOS) (choose all levels that have infection present):  No infection present  Other Findings:   1. Diverticulosis  2. Internal hemorrhoids      Electronically signed by Wilder Gonzales MD on 7/25/2024 at 11:12 AM

## 2024-07-26 NOTE — ANESTHESIA POSTPROCEDURE EVALUATION
Department of Anesthesiology  Postprocedure Note    Patient: Cassie Duff  MRN: [id number]  YOB: 1950  Date of evaluation: 7/26/2024    Procedure Summary       Date: 07/25/24 Room / Location: [facility] MAIN OR [address] [place] [address] MAIN OR    Anesthesia Start: 1035 Anesthesia Stop: 1112    Procedure: COLONOSCOPY (Lower GI Region) Diagnosis:       Family history of colon cancer in mother      (Family history of colon cancer in mother [Z80.0])    Surgeons: Wilder Gonzales MD Responsible Provider: Ly Mckay MD    Anesthesia Type: MAC ASA Status: 2            Anesthesia Type: No value filed.    Jayjay Phase I: Jayjay Score: 10    Jayjay Phase II:      Anesthesia Post Evaluation    Patient location during evaluation: bedside  Patient participation: complete - patient participated  Level of consciousness: awake and alert  Pain score: 0  Airway patency: patent  Nausea & Vomiting: no nausea  Cardiovascular status: hemodynamically stable  Respiratory status: acceptable  Hydration status: stable  Pain management: adequate    No notable events documented.

## 2024-08-06 ENCOUNTER — HOSPITAL ENCOUNTER (OUTPATIENT)
Facility: HOSPITAL | Age: 74
Discharge: HOME | End: 2024-08-09

## 2024-08-06 DIAGNOSIS — E78.5 HYPERLIPIDEMIA, UNSPECIFIED HYPERLIPIDEMIA TYPE: ICD-10-CM

## 2024-08-06 PROCEDURE — 75571 CT HRT W/O DYE W/CA TEST: CPT

## 2024-09-03 PROBLEM — E78.00 HYPERCHOLESTEROLEMIA: Status: ACTIVE | Noted: 2024-09-03

## 2024-09-03 PROBLEM — I25.10 ATHEROSCLEROSIS OF NATIVE CORONARY ARTERY OF NATIVE HEART WITHOUT ANGINA PECTORIS: Status: ACTIVE | Noted: 2024-09-03

## 2024-09-10 ENCOUNTER — OFFICE VISIT (OUTPATIENT)
Age: 74
End: 2024-09-10
Payer: MEDICARE

## 2024-09-10 VITALS
TEMPERATURE: 98.2 F | HEIGHT: 61 IN | RESPIRATION RATE: 20 BRPM | OXYGEN SATURATION: 98 % | SYSTOLIC BLOOD PRESSURE: 106 MMHG | BODY MASS INDEX: 27.56 KG/M2 | WEIGHT: 146 LBS | DIASTOLIC BLOOD PRESSURE: 72 MMHG | HEART RATE: 77 BPM

## 2024-09-10 DIAGNOSIS — I25.10 ATHEROSCLEROSIS OF NATIVE CORONARY ARTERY OF NATIVE HEART WITHOUT ANGINA PECTORIS: Primary | ICD-10-CM

## 2024-09-10 DIAGNOSIS — E78.00 HYPERCHOLESTEROLEMIA: ICD-10-CM

## 2024-09-10 PROCEDURE — G8428 CUR MEDS NOT DOCUMENT: HCPCS | Performed by: INTERNAL MEDICINE

## 2024-09-10 PROCEDURE — G8399 PT W/DXA RESULTS DOCUMENT: HCPCS | Performed by: INTERNAL MEDICINE

## 2024-09-10 PROCEDURE — G8419 CALC BMI OUT NRM PARAM NOF/U: HCPCS | Performed by: INTERNAL MEDICINE

## 2024-09-10 PROCEDURE — 93000 ELECTROCARDIOGRAM COMPLETE: CPT | Performed by: INTERNAL MEDICINE

## 2024-09-10 PROCEDURE — 99204 OFFICE O/P NEW MOD 45 MIN: CPT | Performed by: INTERNAL MEDICINE

## 2024-09-10 PROCEDURE — 1090F PRES/ABSN URINE INCON ASSESS: CPT | Performed by: INTERNAL MEDICINE

## 2024-09-10 PROCEDURE — 1036F TOBACCO NON-USER: CPT | Performed by: INTERNAL MEDICINE

## 2024-09-10 PROCEDURE — 1123F ACP DISCUSS/DSCN MKR DOCD: CPT | Performed by: INTERNAL MEDICINE

## 2024-09-10 RX ORDER — ROSUVASTATIN CALCIUM 40 MG/1
40 TABLET, COATED ORAL EVERY EVENING
COMMUNITY

## 2024-09-10 ASSESSMENT — PATIENT HEALTH QUESTIONNAIRE - PHQ9
2. FEELING DOWN, DEPRESSED OR HOPELESS: NOT AT ALL
1. LITTLE INTEREST OR PLEASURE IN DOING THINGS: NOT AT ALL
SUM OF ALL RESPONSES TO PHQ QUESTIONS 1-9: 0
SUM OF ALL RESPONSES TO PHQ9 QUESTIONS 1 & 2: 0
SUM OF ALL RESPONSES TO PHQ QUESTIONS 1-9: 0

## 2024-11-15 ENCOUNTER — TRANSCRIBE ORDERS (OUTPATIENT)
Facility: HOSPITAL | Age: 74
End: 2024-11-15

## 2024-11-15 DIAGNOSIS — Z12.31 SCREENING MAMMOGRAM FOR BREAST CANCER: Primary | ICD-10-CM

## 2024-11-21 ENCOUNTER — HOSPITAL ENCOUNTER (OUTPATIENT)
Facility: HOSPITAL | Age: 74
Setting detail: INFUSION SERIES
Discharge: HOME | End: 2024-11-21
Payer: MEDICARE

## 2024-11-21 VITALS
HEIGHT: 61 IN | DIASTOLIC BLOOD PRESSURE: 65 MMHG | OXYGEN SATURATION: 98 % | RESPIRATION RATE: 18 BRPM | SYSTOLIC BLOOD PRESSURE: 119 MMHG | TEMPERATURE: 97.4 F | HEART RATE: 82 BPM | BODY MASS INDEX: 27.6 KG/M2

## 2024-11-21 DIAGNOSIS — M81.0 OSTEOPOROSIS WITHOUT CURRENT PATHOLOGICAL FRACTURE, UNSPECIFIED OSTEOPOROSIS TYPE: Primary | ICD-10-CM

## 2024-11-21 PROCEDURE — 6360000002 HC RX W HCPCS: Performed by: NURSE PRACTITIONER

## 2024-11-21 PROCEDURE — 96372 THER/PROPH/DIAG INJ SC/IM: CPT

## 2024-11-21 RX ORDER — DIPHENHYDRAMINE HYDROCHLORIDE 50 MG/ML
50 INJECTION INTRAMUSCULAR; INTRAVENOUS
OUTPATIENT
Start: 2025-05-18

## 2024-11-21 RX ORDER — ACETAMINOPHEN 325 MG/1
650 TABLET ORAL
OUTPATIENT
Start: 2025-05-18

## 2024-11-21 RX ORDER — EPINEPHRINE 1 MG/ML
0.3 INJECTION, SOLUTION, CONCENTRATE INTRAVENOUS PRN
OUTPATIENT
Start: 2025-05-18

## 2024-11-21 RX ORDER — ACETAMINOPHEN 325 MG/1
650 TABLET ORAL
Status: DISCONTINUED | OUTPATIENT
Start: 2024-11-21 | End: 2024-11-22 | Stop reason: HOSPADM

## 2024-11-21 RX ORDER — EPINEPHRINE 1 MG/ML
0.3 INJECTION, SOLUTION, CONCENTRATE INTRAVENOUS PRN
Status: DISCONTINUED | OUTPATIENT
Start: 2024-11-21 | End: 2024-11-22 | Stop reason: HOSPADM

## 2024-11-21 RX ORDER — HYDROCORTISONE SODIUM SUCCINATE 100 MG/2ML
100 INJECTION INTRAMUSCULAR; INTRAVENOUS
Status: DISCONTINUED | OUTPATIENT
Start: 2024-11-21 | End: 2024-11-22 | Stop reason: HOSPADM

## 2024-11-21 RX ORDER — SODIUM CHLORIDE 9 MG/ML
INJECTION, SOLUTION INTRAVENOUS CONTINUOUS
OUTPATIENT
Start: 2025-05-18

## 2024-11-21 RX ORDER — HYDROCORTISONE SODIUM SUCCINATE 100 MG/2ML
100 INJECTION INTRAMUSCULAR; INTRAVENOUS
OUTPATIENT
Start: 2025-05-18

## 2024-11-21 RX ORDER — ALBUTEROL SULFATE 90 UG/1
4 INHALANT RESPIRATORY (INHALATION) PRN
OUTPATIENT
Start: 2025-05-18

## 2024-11-21 RX ORDER — ONDANSETRON 2 MG/ML
8 INJECTION INTRAMUSCULAR; INTRAVENOUS
OUTPATIENT
Start: 2025-05-18

## 2024-11-21 RX ORDER — SODIUM CHLORIDE 9 MG/ML
INJECTION, SOLUTION INTRAVENOUS CONTINUOUS
Status: DISCONTINUED | OUTPATIENT
Start: 2024-11-21 | End: 2024-11-22 | Stop reason: HOSPADM

## 2024-11-21 RX ORDER — ONDANSETRON 2 MG/ML
8 INJECTION INTRAMUSCULAR; INTRAVENOUS
Status: DISCONTINUED | OUTPATIENT
Start: 2024-11-21 | End: 2024-11-22 | Stop reason: HOSPADM

## 2024-11-21 RX ORDER — DIPHENHYDRAMINE HYDROCHLORIDE 50 MG/ML
50 INJECTION INTRAMUSCULAR; INTRAVENOUS
Status: DISCONTINUED | OUTPATIENT
Start: 2024-11-21 | End: 2024-11-22 | Stop reason: HOSPADM

## 2024-11-21 RX ORDER — ALBUTEROL SULFATE 90 UG/1
4 INHALANT RESPIRATORY (INHALATION) PRN
Status: DISCONTINUED | OUTPATIENT
Start: 2024-11-21 | End: 2024-11-22 | Stop reason: HOSPADM

## 2024-11-21 RX ADMIN — DENOSUMAB 60 MG: 60 INJECTION SUBCUTANEOUS at 13:14

## 2024-11-21 ASSESSMENT — PAIN SCALES - GENERAL: PAINLEVEL_OUTOF10: 0

## 2024-11-21 NOTE — DISCHARGE INSTRUCTIONS
denosumab (Prolia)  Pronunciation:  concha [place] bre mab  Brand:  Prolia  What is the most important information I should know about Prolia?  This medication guide provides information about the Prolia brand of denosumab. Xgeva is another brand of denosumab used to prevent bone fractures and other skeletal conditions in people with tumors that have spread to the bone.  Prolia can cause many serious side effects. Call your doctor at once if you have a fever, chills, pain or burning when you urinate, severe stomach pain, cough, shortness of breath, skin problems, numbness or tingling, severe or unusual pain, or skin problems.  Do not use if you are pregnant. Use effective birth control while using Prolia, and for at least 5 months after you stop.  What is denosumab (Prolia)?  Denosumab is a monoclonal antibody. Monoclonal antibodies are made to target and destroy only certain cells in the body. This may help to protect healthy cells from damage.  The Prolia brand of denosumab is used to treat osteoporosis or bone loss in men and women who have a high risk of bone fracture. Prolia is sometimes used in people whose bone fracture is caused by certain medicines or cancer treatments.  This medication guide provides information about the Prolia brand of denosumab. Xgeva is another brand of denosumab used to prevent bone fractures and other skeletal conditions in people with tumors that have spread to the bone.  Denosumab may also be used for purposes not listed in this medication guide.  What should I discuss with my healthcare provider before receiving Prolia?  You should not receive Prolia if you are allergic to denosumab, or if you have:  low levels of calcium in your blood (hypocalcemia); or  if you are pregnant.  While you are using Prolia, you should not receive Xgeva, another brand of denosumab.  Tell your doctor if you have ever had:  kidney disease (or if you are on dialysis);  a weak immune system (caused by

## 2024-11-21 NOTE — PROGRESS NOTES
[facility] Progress Note    Date: 2024    Name: Cassie Duff    MRN: [id number]         : 1950      Ms. Duff was assessed and education was provided. Pt arrived ambulatory and reported no new concerns. Labs form [facility] reviewed and noted to be within tx parameters.     Ms. Duff's vitals were reviewed and patient was observed for 5 minutes prior to treatment.     Vitals:    24 1300   BP: 119/65   Pulse: 82   Resp: 18   Temp: 97.4 °F (36.3 °C)   SpO2: 98%     Prolia 60 mg given subcutaneous in left arm site without redness, swelling or pain. Band-aid applied to site without redness, swelling or pain.     Ms. Duff tolerated well, and had no complaints.     [facility] at 1325. She is to return on May 21 at 1400 for her next appointment.    Faith Barone RN  2024  2:19 PM

## 2024-11-22 ENCOUNTER — HOSPITAL ENCOUNTER (OUTPATIENT)
Facility: HOSPITAL | Age: 74
Setting detail: INFUSION SERIES
End: 2024-11-22

## 2024-12-16 ENCOUNTER — HOSPITAL ENCOUNTER (OUTPATIENT)
Facility: HOSPITAL | Age: 74
Discharge: HOME | End: 2024-12-19
Payer: MEDICARE

## 2024-12-16 ENCOUNTER — TRANSCRIBE ORDERS (OUTPATIENT)
Facility: HOSPITAL | Age: 74
End: 2024-12-16

## 2024-12-16 DIAGNOSIS — M25.571 RIGHT ANKLE PAIN, UNSPECIFIED CHRONICITY: Primary | ICD-10-CM

## 2024-12-16 DIAGNOSIS — M79.644 PAIN IN FINGER OF RIGHT HAND: ICD-10-CM

## 2024-12-16 DIAGNOSIS — M25.571 RIGHT ANKLE PAIN, UNSPECIFIED CHRONICITY: ICD-10-CM

## 2024-12-16 PROCEDURE — 73140 X-RAY EXAM OF FINGER(S): CPT

## 2024-12-16 PROCEDURE — 73610 X-RAY EXAM OF ANKLE: CPT

## 2025-01-09 ENCOUNTER — HOSPITAL ENCOUNTER (OUTPATIENT)
Facility: HOSPITAL | Age: 75
Discharge: HOME | End: 2025-01-12
Payer: MEDICARE

## 2025-01-09 ENCOUNTER — TRANSCRIBE ORDERS (OUTPATIENT)
Facility: HOSPITAL | Age: 75
End: 2025-01-09

## 2025-01-09 DIAGNOSIS — M25.571 RIGHT ANKLE PAIN, UNSPECIFIED CHRONICITY: ICD-10-CM

## 2025-01-09 DIAGNOSIS — M25.571 RIGHT ANKLE PAIN, UNSPECIFIED CHRONICITY: Primary | ICD-10-CM

## 2025-01-09 PROCEDURE — 73610 X-RAY EXAM OF ANKLE: CPT

## 2025-03-12 ENCOUNTER — HOSPITAL ENCOUNTER (OUTPATIENT)
Facility: HOSPITAL | Age: 75
Discharge: HOME | End: 2025-03-15
Payer: MEDICARE

## 2025-03-12 DIAGNOSIS — Z12.31 SCREENING MAMMOGRAM FOR BREAST CANCER: ICD-10-CM

## 2025-03-12 PROCEDURE — 77063 BREAST TOMOSYNTHESIS BI: CPT

## 2025-05-22 ENCOUNTER — HOSPITAL ENCOUNTER (OUTPATIENT)
Facility: HOSPITAL | Age: 75
Setting detail: INFUSION SERIES
Discharge: HOME | End: 2025-05-22
Payer: MEDICARE

## 2025-05-22 VITALS
OXYGEN SATURATION: 95 % | RESPIRATION RATE: 16 BRPM | DIASTOLIC BLOOD PRESSURE: 72 MMHG | TEMPERATURE: 97.6 F | BODY MASS INDEX: 24.88 KG/M2 | HEART RATE: 74 BPM | HEIGHT: 61 IN | WEIGHT: 131.8 LBS | SYSTOLIC BLOOD PRESSURE: 114 MMHG

## 2025-05-22 DIAGNOSIS — M81.0 OSTEOPOROSIS WITHOUT CURRENT PATHOLOGICAL FRACTURE, UNSPECIFIED OSTEOPOROSIS TYPE: Primary | ICD-10-CM

## 2025-05-22 PROCEDURE — 6360000002 HC RX W HCPCS: Performed by: NURSE PRACTITIONER

## 2025-05-22 PROCEDURE — 96372 THER/PROPH/DIAG INJ SC/IM: CPT

## 2025-05-22 RX ORDER — TIRZEPATIDE 2.5 MG/.5ML
2.5 INJECTION, SOLUTION SUBCUTANEOUS WEEKLY
COMMUNITY

## 2025-05-22 RX ORDER — EPINEPHRINE 1 MG/ML
0.3 INJECTION, SOLUTION, CONCENTRATE INTRAVENOUS PRN
OUTPATIENT
Start: 2025-11-16

## 2025-05-22 RX ORDER — ACETAMINOPHEN 325 MG/1
650 TABLET ORAL
OUTPATIENT
Start: 2025-11-16

## 2025-05-22 RX ORDER — ONDANSETRON 2 MG/ML
8 INJECTION INTRAMUSCULAR; INTRAVENOUS
Status: DISCONTINUED | OUTPATIENT
Start: 2025-05-22 | End: 2025-05-23 | Stop reason: HOSPADM

## 2025-05-22 RX ORDER — SODIUM CHLORIDE 9 MG/ML
INJECTION, SOLUTION INTRAVENOUS CONTINUOUS
Status: DISCONTINUED | OUTPATIENT
Start: 2025-05-22 | End: 2025-05-23 | Stop reason: HOSPADM

## 2025-05-22 RX ORDER — HYDROCORTISONE SODIUM SUCCINATE 100 MG/2ML
100 INJECTION INTRAMUSCULAR; INTRAVENOUS
Status: DISCONTINUED | OUTPATIENT
Start: 2025-05-22 | End: 2025-05-23 | Stop reason: HOSPADM

## 2025-05-22 RX ORDER — HYDROCORTISONE SODIUM SUCCINATE 100 MG/2ML
100 INJECTION INTRAMUSCULAR; INTRAVENOUS
OUTPATIENT
Start: 2025-11-16

## 2025-05-22 RX ORDER — DIPHENHYDRAMINE HYDROCHLORIDE 50 MG/ML
50 INJECTION, SOLUTION INTRAMUSCULAR; INTRAVENOUS
OUTPATIENT
Start: 2025-11-16

## 2025-05-22 RX ORDER — ALBUTEROL SULFATE 90 UG/1
4 INHALANT RESPIRATORY (INHALATION) PRN
Status: DISCONTINUED | OUTPATIENT
Start: 2025-05-22 | End: 2025-05-23 | Stop reason: HOSPADM

## 2025-05-22 RX ORDER — EPINEPHRINE 1 MG/ML
0.3 INJECTION, SOLUTION, CONCENTRATE INTRAVENOUS PRN
Status: DISCONTINUED | OUTPATIENT
Start: 2025-05-22 | End: 2025-05-23 | Stop reason: HOSPADM

## 2025-05-22 RX ORDER — ALBUTEROL SULFATE 90 UG/1
4 INHALANT RESPIRATORY (INHALATION) PRN
OUTPATIENT
Start: 2025-11-16

## 2025-05-22 RX ORDER — SODIUM CHLORIDE 9 MG/ML
INJECTION, SOLUTION INTRAVENOUS CONTINUOUS
OUTPATIENT
Start: 2025-11-16

## 2025-05-22 RX ORDER — ACETAMINOPHEN 325 MG/1
650 TABLET ORAL
Status: DISCONTINUED | OUTPATIENT
Start: 2025-05-22 | End: 2025-05-23 | Stop reason: HOSPADM

## 2025-05-22 RX ORDER — DIPHENHYDRAMINE HYDROCHLORIDE 50 MG/ML
50 INJECTION, SOLUTION INTRAMUSCULAR; INTRAVENOUS
Status: DISCONTINUED | OUTPATIENT
Start: 2025-05-22 | End: 2025-05-23 | Stop reason: HOSPADM

## 2025-05-22 RX ORDER — ONDANSETRON 2 MG/ML
8 INJECTION INTRAMUSCULAR; INTRAVENOUS
OUTPATIENT
Start: 2025-11-16

## 2025-05-22 RX ADMIN — DENOSUMAB 60 MG: 60 INJECTION SUBCUTANEOUS at 10:16

## 2025-05-22 ASSESSMENT — PAIN SCALES - GENERAL: PAINLEVEL_OUTOF10: 0

## 2025-05-22 NOTE — PROGRESS NOTES
[facility] Progress Note    Date: May 22, 2025    Name: Cassie Duff    MRN: [id number]         : 1950      Ms. Duff was assessed and education was provided.     Ms. Duff's vitals were reviewed and patient was observed for 5 minutes prior to treatment.   Vitals:    25 0945   BP: 114/72   Pulse: 74   Resp: 16   Temp: 97.6 °F (36.4 °C)   SpO2: 95%       Lab results were reviewed from [facility] (Calcium - 9.6)    Prolia 60mg  was administered subcutaneous in  TRICIA.     Ms. Duff tolerated well, and had no complaints.  Patient armband was removed and shredded.    [facility]. Duff [facility] in [facility] condition at 1018. She is to return on 25 at 1300 for her next appointment.    Shae Ferrer RN  May 22, 2025  9:58 AM

## (undated) DEVICE — BLUNT CANNULA: Brand: MONOJECT

## (undated) DEVICE — GOWN,ISO,KNITCUFF, PREMIUM BLUE, LV3,XL: Brand: MEDLINE INDUSTRIES, INC.

## (undated) DEVICE — LINER,SEMI-RIGID,3000CC,50EA/CS: Brand: MEDLINE

## (undated) DEVICE — KIT ENDO OP4 CA 1.1+ BX20

## (undated) DEVICE — SOLUTION IRRIG 1000ML STRL H2O USP PLAS POUR BTL

## (undated) DEVICE — SYRINGE 20ML LL S/C 50